# Patient Record
Sex: MALE | Race: OTHER | HISPANIC OR LATINO | URBAN - METROPOLITAN AREA
[De-identification: names, ages, dates, MRNs, and addresses within clinical notes are randomized per-mention and may not be internally consistent; named-entity substitution may affect disease eponyms.]

---

## 2017-10-23 ENCOUNTER — EMERGENCY (EMERGENCY)
Facility: HOSPITAL | Age: 42
LOS: 1 days | Discharge: HOME | End: 2017-10-23
Attending: EMERGENCY MEDICINE | Admitting: EMERGENCY MEDICINE
Payer: COMMERCIAL

## 2017-10-23 VITALS
TEMPERATURE: 98 F | DIASTOLIC BLOOD PRESSURE: 89 MMHG | SYSTOLIC BLOOD PRESSURE: 143 MMHG | OXYGEN SATURATION: 98 % | RESPIRATION RATE: 18 BRPM | HEART RATE: 73 BPM

## 2017-10-23 VITALS
TEMPERATURE: 98 F | OXYGEN SATURATION: 97 % | SYSTOLIC BLOOD PRESSURE: 131 MMHG | HEART RATE: 91 BPM | RESPIRATION RATE: 16 BRPM | DIASTOLIC BLOOD PRESSURE: 82 MMHG

## 2017-10-23 PROCEDURE — 64450 NJX AA&/STRD OTHER PN/BRANCH: CPT

## 2017-10-23 PROCEDURE — 90715 TDAP VACCINE 7 YRS/> IM: CPT

## 2017-10-23 PROCEDURE — 99283 EMERGENCY DEPT VISIT LOW MDM: CPT | Mod: 25

## 2017-10-23 PROCEDURE — 73140 X-RAY EXAM OF FINGER(S): CPT

## 2017-10-23 PROCEDURE — 90471 IMMUNIZATION ADMIN: CPT

## 2017-10-23 PROCEDURE — 64450 NJX AA&/STRD OTHER PN/BRANCH: CPT | Mod: F1

## 2017-10-23 PROCEDURE — 73140 X-RAY EXAM OF FINGER(S): CPT | Mod: 26,LT

## 2017-10-23 RX ORDER — TETANUS TOXOID, REDUCED DIPHTHERIA TOXOID AND ACELLULAR PERTUSSIS VACCINE, ADSORBED 5; 2.5; 8; 8; 2.5 [IU]/.5ML; [IU]/.5ML; UG/.5ML; UG/.5ML; UG/.5ML
0.5 SUSPENSION INTRAMUSCULAR ONCE
Qty: 0 | Refills: 0 | Status: COMPLETED | OUTPATIENT
Start: 2017-10-23 | End: 2017-10-23

## 2017-10-23 RX ORDER — CEPHALEXIN 500 MG
1 CAPSULE ORAL
Qty: 28 | Refills: 0 | OUTPATIENT
Start: 2017-10-23 | End: 2017-10-30

## 2017-10-23 RX ORDER — IBUPROFEN 200 MG
800 TABLET ORAL ONCE
Qty: 0 | Refills: 0 | Status: COMPLETED | OUTPATIENT
Start: 2017-10-23 | End: 2017-10-23

## 2017-10-23 RX ORDER — CEPHALEXIN 500 MG
500 CAPSULE ORAL ONCE
Qty: 0 | Refills: 0 | Status: COMPLETED | OUTPATIENT
Start: 2017-10-23 | End: 2017-10-23

## 2017-10-23 RX ADMIN — TETANUS TOXOID, REDUCED DIPHTHERIA TOXOID AND ACELLULAR PERTUSSIS VACCINE, ADSORBED 0.5 MILLILITER(S): 5; 2.5; 8; 8; 2.5 SUSPENSION INTRAMUSCULAR at 13:05

## 2017-10-23 RX ADMIN — Medication 500 MILLIGRAM(S): at 13:19

## 2017-10-23 RX ADMIN — Medication 800 MILLIGRAM(S): at 13:06

## 2017-10-23 NOTE — ED PROVIDER NOTE - MEDICAL DECISION MAKING DETAILS
Xrays, pain meds, Tdap, abx, likely hand consult, reassess Xrays, pain meds, Tdap, abx, likely hand consult, reassess    ATTENDING MD Kerrie: X-ray with no fx, d/w hand surgery who recommend extensive irrigationa nd f/u in clinic. given wound is a puncture wound and dirty we will allow the wound to heal by secondary intention. pt will be started on prophylactic antibitoics, given tetanus, and has been thoroughly instructed with an  on wound care, 3x daily soaks, need to avoid work until it heals, and warning signs and symptoms that should prompt return to the ED. Pt will f/u in hand clinic.

## 2017-10-23 NOTE — ED ADULT NURSE NOTE - OBJECTIVE STATEMENT
40yo M pt AxOx3 ambulatory to ED c/o L index finger pain/injury. 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 Pt presents w/ nail to finger. Pt is able to move all fingers, denies numbness/tingling. +radial pulses. No active bleeding noted. MD at bedside for eval.

## 2017-10-23 NOTE — ED PROVIDER NOTE - OBJECTIVE STATEMENT
41y R hand dominant Male no PMH, no PSH complaining of left hand 2nd digit nail foreign body. Around 11:30am today, patient was using a nail gun with the nail and part of his dirty glove going through the volar aspect of the proximal phalange. Having some pain. No motor or sensory changes. No bleeding so far. unknown last tetanus vaccine.

## 2017-10-23 NOTE — ED PROVIDER NOTE - ATTENDING CONTRIBUTION TO CARE
ATTENDING MD:  I, Stiven Sy, personally have seen and examined this patient.  I have discussed all aspects of care with the resident physician. Resident note reviewed and agree on plan of care and except where noted.  See HPI, PE, and MDM for details.

## 2017-10-23 NOTE — ED PROVIDER NOTE - PROGRESS NOTE DETAILS
Spoke with Dr. Clay, patient can be seen in the office in the next few days after washing the area out thoroughly, can continue local wound care with hydrogen peroxide.  - Vinicius Barboza MD Some subcutaneous air s/p extensive irrigation, good capillary refill, sensation and motor intact with full range of motion. Patient will follow-up with Dr. Mitch Dee as outpatient in 1-2 days. Patient aware of return precautions due to dirty wound, including for worsening hand function, poor capillary refill, fever, or pus.

## 2017-10-23 NOTE — ED PROCEDURE NOTE - ATTENDING CONTRIBUTION TO CARE
ATTENDING MD:  I, Stiven Sy, was available in the Emergency Department throughout the entire procedure and I was present during the key portions. I agree with the procedure as documented.     Unaware until after the fact that the resident used hydrogen peroxide to irrigate. I do not think any harm to the tissue from this procedure, but advised resident after the fact this is not standard practice and sterile saline would have been preferable. Subcutaneous gas visualized on post-procedure x-ray likely 2/2 hydrogen peroxide and expect it to spontaneously resolve.

## 2017-10-23 NOTE — ED PROVIDER NOTE - PHYSICAL EXAMINATION
PE: CONSTITUTIONAL: Well appearing, well nourished, in no apparent distress. ENMT: Airway patent, nasal mucosa clear, mouth with normal mucosa. HEAD: NCAT EYES: PERRL, EOMI bilaterally CARDIAC: RRR, no m/r/g, no pedal edema RESPIRATORY: CTA bilaterally, no adventitious sounds GI: Abdomen non-distended, non-tender MSK: Spine appears normal, range of motion is not limited, tenderness to palpation of left 2nd digit proximal phalange NEURO: CNII-XII grossly intact, 5/5 strength, full sensation all extremities, gait intact SKIN: Foreign body (nail and work glove) through volar aspect of the left hand 2nd digit proximal phalange PE: CONSTITUTIONAL: Well appearing, well nourished, in no apparent distress. ENMT: Airway patent, nasal mucosa clear, mouth with normal mucosa. HEAD: NCAT EYES: PERRL, EOMI bilaterally CARDIAC: RRR, no m/r/g, no pedal edema RESPIRATORY: CTA bilaterally, no adventitious sounds GI: Abdomen non-distended, non-tender MSK: Spine appears normal, range of motion is not limited, tenderness to palpation of left 2nd digit proximal phalange NEURO: CNII-XII grossly intact, 5/5 strength, full sensation all extremities, gait intact SKIN: Foreign body (nail and work glove) through volar aspect of the left hand 2nd digit proximal phalange    ATTENDING MD Kerrie: well appearing, mild distress from pain. heart normal rate, regular rhythm. 2+ radial pusle throughout, <2 sec cap refill in all digits of bilateral hands. Full ROM of all digits at all joints in bilaterally hands. no sensory deficits of hands. approximately 2" metal nail in soft tissue through and through of proximal phalnge of L-2nd digit. no bony tendernses, +soft tissue tendernss. distal sensation, cap refill and ROm intact.

## 2017-10-23 NOTE — ED PROVIDER NOTE - CARE PLAN
Principal Discharge DX:	Foreign body (FB) in soft tissue  Instructions for follow-up, activity and diet:	1) Please follow-up with Dr. Mitch Dee within the next 1-2 days.  Please call today or tomorrow for an appointment.  If you cannot follow-up with your doctor(s), please return to the ED for any urgent issues.  2) If you have any worsening of symptoms or any other concerns please return to the ED immediately.  3) Please continue taking your home medications as directed. Take the antibiotics as instructed. You may take acetaminophen (Tylenol) and ibuprofen (Motrin) as per instructions on the medication box for fever/pain, do not exceed the recommended daily limits.   4) You may have been given a copy of your labs and/or imaging.  Please go over these with your primary care doctor. The foreign body was removed. Principal Discharge DX:	Foreign body (FB) in soft tissue  Goal:	left 2nd digit  Instructions for follow-up, activity and diet:	1) Please follow-up with Dr. Mitch Dee within the next 1-2 days.  Please call today or tomorrow for an appointment.  If you cannot follow-up with your doctor(s), please return to the ED for any urgent issues.  2) If you have any worsening of symptoms or any other concerns please return to the ED immediately.  3) Please continue taking your home medications as directed. Take the antibiotics as instructed. You may take acetaminophen (Tylenol) and ibuprofen (Motrin) as per instructions on the medication box for fever/pain, do not exceed the recommended daily limits.   4) You may have been given a copy of your labs and/or imaging.  Please go over these with your primary care doctor. The foreign body was removed.

## 2017-10-23 NOTE — ED PROVIDER NOTE - PLAN OF CARE
1) Please follow-up with Dr. Mitch Dee within the next 1-2 days.  Please call today or tomorrow for an appointment.  If you cannot follow-up with your doctor(s), please return to the ED for any urgent issues.  2) If you have any worsening of symptoms or any other concerns please return to the ED immediately.  3) Please continue taking your home medications as directed. Take the antibiotics as instructed. You may take acetaminophen (Tylenol) and ibuprofen (Motrin) as per instructions on the medication box for fever/pain, do not exceed the recommended daily limits.   4) You may have been given a copy of your labs and/or imaging.  Please go over these with your primary care doctor. The foreign body was removed. left 2nd digit

## 2017-10-23 NOTE — ED PROCEDURE NOTE - PROCEDURE ADDITIONAL DETAILS
Removed nail with forceps from proximal phalange  Digital block with 5cc lidocaine  Irrigated with saline and hydrogen peroxide Removed nail with forceps from proximal phalange  Digital block with 5cc lidocaine  Irrigated with saline and hydrogen peroxide    Follow-up xray reviewed, subcutaneous air due to irrigation, but able to escape through the open wounds Removed metal nail (foreign body) with forceps from proximal phalange  Digital block with 5cc lidocaine  Irrigated with saline and hydrogen peroxide  Sterile dressing and splint placed    Follow-up xray reviewed, subcutaneous air due to irrigation, but able to escape through the open wounds
